# Patient Record
Sex: MALE | Race: WHITE
[De-identification: names, ages, dates, MRNs, and addresses within clinical notes are randomized per-mention and may not be internally consistent; named-entity substitution may affect disease eponyms.]

---

## 2019-03-25 ENCOUNTER — HOSPITAL ENCOUNTER (EMERGENCY)
Dept: HOSPITAL 88 - ER | Age: 59
Discharge: HOME | End: 2019-03-25
Payer: COMMERCIAL

## 2019-03-25 VITALS — SYSTOLIC BLOOD PRESSURE: 149 MMHG | DIASTOLIC BLOOD PRESSURE: 96 MMHG

## 2019-03-25 VITALS — WEIGHT: 195 LBS | BODY MASS INDEX: 27.3 KG/M2 | HEIGHT: 71 IN

## 2019-03-25 DIAGNOSIS — Z85.47: ICD-10-CM

## 2019-03-25 DIAGNOSIS — G89.29: ICD-10-CM

## 2019-03-25 DIAGNOSIS — M54.9: ICD-10-CM

## 2019-03-25 DIAGNOSIS — R07.89: Primary | ICD-10-CM

## 2019-03-25 LAB
ALBUMIN SERPL-MCNC: 3.9 G/DL (ref 3.5–5)
ALBUMIN/GLOB SERPL: 1.1 {RATIO} (ref 0.8–2)
ALP SERPL-CCNC: 102 IU/L (ref 40–150)
ALT SERPL-CCNC: 26 IU/L (ref 0–55)
ANION GAP SERPL CALC-SCNC: 15 MMOL/L (ref 8–16)
BASOPHILS # BLD AUTO: 0.1 10*3/UL (ref 0–0.1)
BASOPHILS NFR BLD AUTO: 0.7 % (ref 0–1)
BUN SERPL-MCNC: 15 MG/DL (ref 7–26)
BUN/CREAT SERPL: 16 (ref 6–25)
CALCIUM SERPL-MCNC: 10.3 MG/DL (ref 8.4–10.2)
CHLORIDE SERPL-SCNC: 105 MMOL/L (ref 98–107)
CK MB SERPL-MCNC: 3.4 NG/ML (ref 0–5)
CK MB SERPL-MCNC: 3.8 NG/ML (ref 0–5)
CK SERPL-CCNC: 159 IU/L (ref 30–200)
CK SERPL-CCNC: 181 IU/L (ref 30–200)
CO2 SERPL-SCNC: 24 MMOL/L (ref 22–29)
DEPRECATED APTT PLAS QN: 31.5 SECONDS (ref 23.8–35.5)
DEPRECATED INR PLAS: 1.02
DEPRECATED NEUTROPHILS # BLD AUTO: 6.1 10*3/UL (ref 2.1–6.9)
EGFRCR SERPLBLD CKD-EPI 2021: > 60 ML/MIN (ref 60–?)
EOSINOPHIL # BLD AUTO: 0.2 10*3/UL (ref 0–0.4)
EOSINOPHIL NFR BLD AUTO: 2.4 % (ref 0–6)
ERYTHROCYTE [DISTWIDTH] IN CORD BLOOD: 12.6 % (ref 11.7–14.4)
GLOBULIN PLAS-MCNC: 3.5 G/DL (ref 2.3–3.5)
GLUCOSE SERPLBLD-MCNC: 119 MG/DL (ref 74–118)
HCT VFR BLD AUTO: 44.9 % (ref 38.2–49.6)
HGB BLD-MCNC: 15.2 G/DL (ref 14–18)
LYMPHOCYTES # BLD: 1.6 10*3/UL (ref 1–3.2)
LYMPHOCYTES NFR BLD AUTO: 18.2 % (ref 18–39.1)
MCH RBC QN AUTO: 31.3 PG (ref 28–32)
MCHC RBC AUTO-ENTMCNC: 33.9 G/DL (ref 31–35)
MCV RBC AUTO: 92.4 FL (ref 81–99)
MONOCYTES # BLD AUTO: 0.6 10*3/UL (ref 0.2–0.8)
MONOCYTES NFR BLD AUTO: 7.4 % (ref 4.4–11.3)
NEUTS SEG NFR BLD AUTO: 70.8 % (ref 38.7–80)
PLATELET # BLD AUTO: 292 X10E3/UL (ref 140–360)
POTASSIUM SERPL-SCNC: 4 MMOL/L (ref 3.5–5.1)
PROTHROMBIN TIME: 13.9 SECONDS (ref 11.9–14.5)
RBC # BLD AUTO: 4.86 X10E6/UL (ref 4.3–5.7)
SODIUM SERPL-SCNC: 140 MMOL/L (ref 136–145)

## 2019-03-25 PROCEDURE — 93005 ELECTROCARDIOGRAM TRACING: CPT

## 2019-03-25 PROCEDURE — 99284 EMERGENCY DEPT VISIT MOD MDM: CPT

## 2019-03-25 PROCEDURE — 36415 COLL VENOUS BLD VENIPUNCTURE: CPT

## 2019-03-25 PROCEDURE — 82550 ASSAY OF CK (CPK): CPT

## 2019-03-25 PROCEDURE — 80053 COMPREHEN METABOLIC PANEL: CPT

## 2019-03-25 PROCEDURE — 85730 THROMBOPLASTIN TIME PARTIAL: CPT

## 2019-03-25 PROCEDURE — 84484 ASSAY OF TROPONIN QUANT: CPT

## 2019-03-25 PROCEDURE — 85610 PROTHROMBIN TIME: CPT

## 2019-03-25 PROCEDURE — 85025 COMPLETE CBC W/AUTO DIFF WBC: CPT

## 2019-03-25 PROCEDURE — 82553 CREATINE MB FRACTION: CPT

## 2019-03-25 PROCEDURE — 71045 X-RAY EXAM CHEST 1 VIEW: CPT

## 2019-03-25 RX ADMIN — Medication ONE MG: at 15:16

## 2019-03-25 RX ADMIN — Medication ONE MG: at 13:59

## 2019-03-25 NOTE — DIAGNOSTIC IMAGING REPORT
Examination: Single AP view of the chest.



COMPARISON: None.



INDICATION: Chest pain

     

DISCUSSION:



Lines/tubes:  None.



Lungs:  The lungs are well inflated and clear. There is no evidence of

pneumonia or pulmonary edema.



Pleura:  There is no pleural effusion or pneumothorax.



Heart and mediastinum:  The heart and the mediastinum are unremarkable.



Bones and soft tissues:  No acute bony abnormalities.  Right upper quadrant

surgical clips likely reflective of cholecystectomy.



IMPRESSION:

 

1.   No acute cardiopulmonary abnormalities.



Signed by: Dr. Geovanny Casanova M.D. on 3/25/2019 12:30 PM

## 2019-03-25 NOTE — XMS REPORT
Summary of Care

                             Created on: 2018



ELISABETH STALLINGS

External Reference #: [id number]

: 1960

Sex: Male



Demographics







                          Address                   [address] [place] [address]  [address]

 

                          Preferred Language        Unknown

 

                          Marital Status            Unknown

 

                          L[employer] Affiliation     Unknown

 

                          Race                      Other Race

 

                          Ethnic Group              Non-





Author







                          Author                    Aarti Shane

 

                          [employer] [facility]              Unknown

 

                          Address                   Unknown

 

                          Phone                     Unavailable







Care Team Providers







                    Care Team Member Name    Role                Phone

 

                    MELVIN DEL VALLE M.D.    Unavailable         Unavailable

 

                    MARLENI VILLAR MD    Unavailable         Unavailable

 

                          Unavailable               Unavailable







Functional Status







                    Name                Dates               Details

 

                                        Functional status health issues are not documented

                                                    Status: 









                    Name                Dates               Details

 

                                        Cognitive status health issues are not documented

                                                    Status: 







Problems







                    Name                Dates               Details

 

                                        Depressive disorder (311, F32.9) 

                                                    Status: Active

 

                                        Primary insomnia (307.42, F51.01) 

                                                    Status: Active

 

                                        Insomnia (780.52, G47.00) 

                                                    Status: Active

 

                                        Essential tremor (333.1, G25.0) 

                                                    Status: Active

 

                                        Cognitive complaints (799.59, R41.9) 

                                                    Status: Active

 

                                        Tension headache (307.81, G44.209) 

                                                    Status: Active







Medications







                    Name                Dates               Details

 

                                        Propranolol HCl ER 60 MG Oral Capsule Extended Release 24 Hour

TAKE 1 CAPSULE DAILY



 

                                         Quantity: 30









MELVIN DEL VALLE M.D. * 





                                         Start : 14-Sep-2017





Active

Amitriptyline HCl - 10 MG Oral Tablet

TAKE 0.5 TABLET BEDTIME

* 





                           Quantity: 15              Refills: 6









MELVIN DEL VALLE M.D. * 





                                         Start : 2018





Active





Allergies and Adverse Reactions







                    Name                Dates               Details

 

                    Iodine SOLN (Allergy)                        Status: Active









Procedures







                    Procedure           Dates               Details

 

                    Procedures not documented                         







Immunization







                    Name                Dates               Details

 

                                        Immunizations not documented

                                                     







Social History







                    Name                Dates               Details

 

                                        -

                                                    Status: 









                    Name                Dates               Details

 

                    Former smoker                            







Vital Signs







                Date            Test            Result          Details

 

                                                                 

 

                    No Known Vitals to report                         







Results







                Date            Description     Value           Details

 

                    Results not documented                         

 

                                                                 







Plan of Care







                    Name                Dates               Details

 

                                        Planned Observations 

 

                    Planned Goals not documented                         

 

                                        Planned Encounters 

 

                                        Appointment; JOS TORRES, PHD

                                        On: 27-Mar-2018 16:00



 

                                        Appointment; MELVIN DEL VALLE M.D.

                                        On: 2018 10:30









Instructions







                    Name                Dates               Details

 

                                        Instructions not documented

                                                     







Encounters







                                        Appointment; MELVIN DEL VALLE M.D. 

Encounter Diagnosis: Problem not documented

                                        On: 2017 9:30



 

                                        Appointment; MELVIN DEL VALLE M.D. 

Encounter Diagnosis: Problem not documented

                                        On: 14-Sep-2017 9:30



 

                                        Appointment; MELVIN DEL VALLE M.D. 

Encounter Diagnosis: Problem not documented

                                        On: 2018 10:30



 

                                        Appointment; JOS TORRES, PHD 

Encounter Diagnosis: Problem not documented

                                        On: 2018 8:30



 

                                        Appointment; JOS TORRES, PHD 

Encounter Diagnosis: Problem not documented

                                        On: 5-Mar-2018 15:00

## 2019-03-25 NOTE — XMS REPORT
Clinical Summary

                             Created on: 2019



PatsyGeovanny crooks

External Reference #: [id number]

: 1960

Sex: Male



Demographics







                          Address                   [address]

[place]  [address]

 

                          Home Phone                [phone]

 

                          Preferred Language        English

 

                          Marital Status            Unknown

 

                          Faith Affiliation     None

 

                          Race                      White

 

                          Ethnic Group              Non-





Author







                          Author                    [facility]

 

                          Address                   Unknown

 

                          Phone                     Unavailable







Support







                Name            Relationship    Address         Phone

 

                    Evette Stallings       [employer]                [address]

[place]  [address]                      [phone]

 

                CURRY STALLINGS    [employer]            Unknown         [phone]







Care Team Providers







                    Care Team Member Name    Role                Phone

 

                    Nestor Chu    PCP                 [phone]







Allergies







                                        Comments



                 Active Allergy     Reactions       Severity        Noted Date 

 

                                         



                 Iodine And Iodide     Anaphylaxis     High            2015 



                                         Containing Products    







Medications







                          End Date                  Status



              Medication     Sig          Dispensed     Refills      Start  



                                         Date  

 

                                                    Active



                 propranolol (INDERAL) 60     Take 0.5        0                 



                     MG tablet           tablets (30         7  



                                         mg total) by     



                                         mouth once at     



                                         bedtime.     

 

                                                    Active



                     cyanocobalamin (VITAMIN     Take 100 mcg        0   



                           B-12) 100 MCG tablet      by mouth     



                                         daily.     

 

                          2019                Discontinued



                     difluprednate (DUREZOL)     Apply to            0   



                           0.05 % Drop               eye(s) 4     



                                         (four) times     



                                         daily.     







Active Problems







 



                           Problem                   Noted Date

 

 



                           Essential tremor          03/10/2017

 

 



                           Bradycardia               03/10/2017

 

 



                           Chest pressure            2017







Encounters







                          Care Team                 Description



                     Date                Type                Specialty  

 

                                        



Pat Mcbride MD             



                           2019                Anesthesia   



                                         Event   

 

                                        



Dean Palma MD                 EXTRACTION,CATARACT W/IOL



                           2019                Surgery   

 

                                        



Dean Palma MD                  



                           2019                Hospital   



                                         Encounter   

 

                                                     



                     2019          Hospital            Pre-Admission Testing  



                                         Encounter   



after 2018



Family History







   



                 Medical History     Relation        Name            Comments

 

   



                           COPD                      Father  

 

   



                           Cancer                    Father  

 

   



                           Hypertension              Father  

 

   



                           Arthritis                 Mother  

 

   



                           Diabetes                  Paternal  



                                         Uncle  

 

   



                           Cancer                    Sister  









   



                 Relation        Name            Status          Comments

 

   



                                         Father   

 

   



                                         Mother   

 

   



                                         Paternal Uncle   

 

   



                                         Sister   







Social History







                                        Date



                 Tobacco Use     Types           Packs/Day       Years Used 

 

                                        Quit: 1998



                                         Former Smoker    

 

    



                           Smokeless Tobacco: Former       Quit: 2009



                                         User   









   



                 Alcohol Use     Drinks/Week     oz/Week         Comments

 

   



                 Yes             1 Glasses of     1.8             social drinking



                                         wine  



                                         2 Cans of  



                                         beer  









 



                           Sex Assigned at Birth     Date Recorded

 

 



                                         Not on file 









                                        Industry



                           Job Start Date            Occupation 

 

                                        Not on file



                           Not on file               Not on file 









                                        Travel End



                           Travel History            Travel Start 

 





                                         No recent travel history available.







Last Filed Vital Signs







                                        Time Taken



                           Vital Sign                Reading 

 

                                        2019 12:15 PM CST



                           Blood Pressure            115/74 

 

                                        2019 12:15 PM CST



                           Pulse                     57 

 

                                        2019 11:56 AM CST



                           Temperature               36.6 C (97.9 F) 

 

                                        2019 12:15 PM CST



                           Respiratory Rate          18 

 

                                        2019 12:15 PM CST



                           Oxygen Saturation         96% 

 

                                        -



                           Inhaled Oxygen            - 



                                         Concentration  

 

                                        2019  1:28 PM CST



                           Weight                    88.5 kg (195 lb) 

 

                                        2019  1:28 PM CST



                           Height                    177.8 cm (5' 10") 

 

                                        2019  1:28 PM CST



                           Body Mass Index           27.98 







Plan of Treatment





Not on file



Implants







                    Device Identifier    Shelf Expiration Date    Model / Serial / Lot



                 Implanted       Type            Area            Manufactur   



                                         er   

 

                                        10/12/2019          ZCB00 24.0 /

                                        [id number] /





                 Iol,Tecnis 1-Piece Zcb00 24.0 -     Ophthalmol      Left: Eye       ADVANCED   



                     [id number]         ogy                 MEDICAL   



                           Implanted: Qty: 1 on 2015 by       [employer]   



                           Dean Palma MD       FORMER   



                                         ALLERGAN   

 

                                        2021          [id number] /

                                        [id number] /





                 Iol Tecnis Zcb00 23.0 Nikhil     Ophthalmol      Right: Eye      [employer]V [employer]   



                     [id number] - [id number]     ogy                 [employer]   



                                         Implanted: Qty: 1 on 2019 by      



                                         Dean Palma MD      







Procedures







                                        Comments



                 Procedure Name     Priority        Date/Time       Associated Diagnosis 

 

                                         



                     EXTRACTION,CATARACT W/IOL      2019          Cataract of right eye, 



                           10:30 AM CST              unspecified cataract type 

 

  



                                         Case Notes



                                         CASE



                                         RESCHEDULE



                                         D VIA FAX



                                         ON 19



                                         FROM



                                         19 TO



                                         19



after 2018



Results

Not on fileafter 2018



Insurance







     



            Payer      Benefit     Subscriber ID     Type       Phone      Address



                                         Plan /    



                                         Group    

 

     



                 [facility]     [employer]      xxxxxxxxx       HMO/POS  



                           CARE                      POS SELECT    



                                         CHOICE    









     



            Guarantor Name     Account     Relation to     Date of     Phone      Billing Address



                     Type                Patient             Birth  

 

     



            TheGeovanny crooks     Personal/F     Self       1960     [phone] [address]               (Home)              [place] [address]







Advance Directives





For more information, please contact:



[id number] [place] [address] [phone]









                          Date Inactivated          Comments



                           Code Status               Date Activated  

 

                          3/9/2017  6:27 PM          



                           Full Code                 3/8/2017 10:57 PM  









  



                           This code status was determined by:     Patient

## 2019-03-25 NOTE — XMS REPORT
Patient Summary Document

                             Created on: 2019



ELISABETH STALLINGS

External Reference #: [id number]

: 1960

Sex: Male



Demographics







                          Address                   [address] [place]  [address]

 

                          Home Phone                [phone]

 

                          Preferred Language        Unknown

 

                          Marital Status            Unknown

 

                          Latter-day Affiliation     Unknown

 

                          Race                      Unknown

 

                          Ethnic Group              Unknown





Author







                          Author                    [facility]

 

                          Address                   Unknown

 

                          Phone                     Unavailable







Care Team Providers







                    Care Team Member Name    Role                Phone

 

                    SUSAN HERRERA    Unavailable         Unavailable







Problems

This patient has no known problems.



Allergies, Adverse Reactions, Alerts

This patient has no known allergies or adverse reactions.



Medications

This patient has no known medications.



Results







           Test Description    Test Time    Test Comments    Text Results    Atomic Results    Result

 Comments

 

                HEMOGLOBIN A1C    2017 08:43:00                      

 

   

 

                HEMOGLOBIN A1C (BEAKER) (test code=368)    5.7 %           4.3-6.1          





CREATINE KINASE (CK), TOTAL AND [id number] 23:38:00* 





                Test Item       Value           Reference Range    Comments

 

                CREATINE KINASE TOTAL (BEAKER) (test code=380)    63 U/L                     

 

                CREATINE KINASE-MB (BEAKER) (test code=750)    0.9 ng/mL       0.0-6.6          

 

                CREATINE KINASE-MB INDEX (BEAKER) (test code=395)    1.4 %                            





Effective 2014: CK-MB Reference Range ChangeNew: 0.0-6.6    Previous: 0.0-
4.9CK-MB Reference Range:<6.7      Normal6.7-10.0  Borderline>10.0     Abnormal
TROPONIN -03-01 23:38:00* 





                Test Item       Value           Reference Range    Comments

 

                TROPONIN I (BEAKER) (test code=397)    0.01 ng/mL      0.00-0.03        





Effective 2014: Reference Range ChangeNew: 0.00-0.03   Previous 0.00-0.15T
roponin I (TnI) levels must be interpreted in the context of the presenting symp
toms and the clinical findings. Elevated TnI levels indicate myocardial damage, 
but are not specific for ischemic heart disease. Elevated TnI levels are seen in
patients with other cardiac conditions (including myocarditis and congestive he
art failure), and slight TnI elevations occur in patients with other conditions,
including sepsis, renal failure, acidosis, acute neurological disease, and pers
istent tachyarrhythmia.LIPID [id number] 23:31:00* 





                Test Item       Value           Reference Range    Comments

 

                TRIGLYCERIDES (BEAKER) (test code=540)    133 mg/dL                       Specimen slightly hemolyzed



 

                CHOLESTEROL (BEAKER) (test code=631)    180 mg/dL                       Specimen slightly hemolyzed

 

                HDL CHOLESTEROL (BEAKER) (test code=976)    30 mg/dL                         

 

                LDL CHOLESTEROL CALCULATED (BEAKER) (test code=633)    123 mg/dL                        





Triglyceride Reference Range:   Low Risk         <150   Borderline    150-199   
High Risk     200-499   Very High Risk  >=500Cholesterol Reference Range:   Low 
Risk         <200   Borderline    200-239    High Risk        >240HDL 
Cholesterol Reference Range:   Low Risk         >=60   High Risk         <40LDL 
Cholesterol Reference Range:   Optimal          <100   Near Optimal  100-129   
Borderline    130-159   High          160-189   Very High       >=190   B-TYPE 
NATRIURETIC FACTOR (BNP)2017 21:16:00* 





                Test Item       Value           Reference Range    Comments

 

                B-TYPE NATRIURETIC PEPTIDE (BEAKER) (test code=700)    < pg/mL         0-100            





TROPONIN -74-31 19:22:00* 





                Test Item       Value           Reference Range    Comments

 

                TROPONIN I (BEAKER) (test code=397)    < ng/mL         0.00-0.03        





Effective 2014: Reference Range ChangeNew: 0.00-0.03   Previous 0.00-0.15T
roponin I (TnI) levels must be interpreted in the context of the presenting symp
toms and the clinical findings. Elevated TnI levels indicate myocardial damage, 
but are not specific for ischemic heart disease. Elevated TnI levels are seen in
patients with other cardiac conditions (including myocarditis and congestive he
art failure), and slight TnI elevations occur in patients with other conditions,
including sepsis, renal failure, acidosis, acute neurological disease, and pers
istent tachyarrhythmia.CREATINE KINASE (CK), TOTAL AND [id number] 18:00:00* 





                Test Item       Value           Reference Range    Comments

 

                CREATINE KINASE TOTAL (BEAKER) (test code=380)    74 U/L                     

 

                CREATINE KINASE-MB (BEAKER) (test code=750)    1.0 ng/mL       0.0-6.6          

 

                CREATINE KINASE-MB INDEX (BEAKER) (test code=395)    1.4 %                            





Effective 2014: CK-MB Reference Range ChangeNew: 0.0-6.6    Previous: 0.0-
4.9CK-MB Reference Range:<6.7      Normal6.7-10.0  Borderline>10.0     Abnormal
PT/[id number] 17:54:00* 





                Test Item       Value           Reference Range    Comments

 

                PROTIME (BEAKER) (test code=759)    13.7 seconds    11.7-14.7        

 

                INR (BEAKER) (test code=370)    1.1             <=5.9            

 

                PARTIAL THROMBOPLASTIN TIME (BEAKER) (test code=760)    29.2 seconds    22.5-36.0        







RECOMMENDED COUMADIN/WARFARIN INR THERAPY RANGESSTANDARD DOSE: 2.0 - 3.0   Inclu
colt: PROPHYLAXIS for venous thrombosis, systemic embolization; TREATMENT for panfilo
ous thrombosis and/or pulmonary embolus.HIGH RISK: Target INR is 2.5-3.5 for pat
ients with mechanical heart valves.[id number] 17:54:00* 





                Test Item       Value           Reference Range    Comments

 

                MAGNESIUM (BEAKER) (test code=627)    2.2 mg/dL       1.6-2.6          





BASIC METABOLIC [id number] 17:54:00* 





                Test Item       Value           Reference Range    Comments

 

                SODIUM (BEAKER) (test code=381)    140 meq/L       136-145          

 

                POTASSIUM (BEAKER) (test code=379)    3.8 meq/L       3.5-5.1          

 

                CHLORIDE (BEAKER) (test code=382)    108 meq/L                  

 

                CO2 (BEAKER) (test code=355)    23 meq/L        22-29            

 

                BLOOD UREA NITROGEN (BEAKER) (test code=354)    17 mg/dL        7-21             

 

                CREATININE (BEAKER) (test code=358)    1.00 mg/dL      0.57-1.25        

 

                GLUCOSE RANDOM (BEAKER) (test code=652)    108 mg/dL                  

 

                CALCIUM (BEAKER) (test code=697)    9.4 mg/dL       8.4-10.2         

 

                EGFR (BEAKER) (test code=1092)    78 mL/min/1.73 sq m                    ESTIMATED GFR IS NOT AS 

ACCURATE AS CREATININE CLEARANCE IN PREDICTING GLOMERULAR FILTRATION RATE. 
ESTIMATED GFR IS NOT APPLICABLE FOR DIALYSIS PATIENTS.





CBC W/PLT COUNT & AUTO [id number] 17:43:00* 





                Test Item       Value           Reference Range    Comments

 

                WHITE BLOOD CELL COUNT (BEAKER) (test code=775)    7.3 K/ L        4.0-10.0         

 

                RED BLOOD CELL COUNT (BEAKER) (test code=761)    4.65 M/ L       4.20-5.80        

 

                HEMOGLOBIN (BEAKER) (test code=410)    15.0 GM/DL      13.0-16.8        

 

                HEMATOCRIT (BEAKER) (test code=411)    42.8 %          40.0-50.0        

 

                MEAN CORPUSCULAR VOLUME (BEAKER) (test code=753)    91.9 fL         82.0-98.0        

 

                MEAN CORPUSCULAR HEMOGLOBIN (BEAKER) (test code=751)    32.2 pg         27.0-33.0        

 

                    MEAN CORPUSCULAR HEMOGLOBIN CONC (BEAKER) (test code=752)    35.1 GM/DL          32.0-36.0

                                         

 

                RED CELL DISTRIBUTION WIDTH (BEAKER) (test code=412)    12.6 %          10.3-14.2        

 

                PLATELET COUNT (BEAKER) (test code=756)    234 K/CU MM     150-430          

 

                MEAN PLATELET VOLUME (BEAKER) (test code=754)    6.8 fL          6.5-10.5         

 

                NUCLEATED RED BLOOD CELLS (BEAKER) (test code=413)    0 /100 WBC      0-0              

 

                NEUTROPHILS RELATIVE PERCENT (BEAKER) (test code=429)    62 %                             

 

                LYMPHOCYTES RELATIVE PERCENT (BEAKER) (test code=430)    27 %                             

 

                MONOCYTES RELATIVE PERCENT (BEAKER) (test code=431)    8 %                              

 

                EOSINOPHILS RELATIVE PERCENT (BEAKER) (test code=432)    3 %                              

 

                BASOPHILS RELATIVE PERCENT (BEAKER) (test code=437)    1 %                              

 

                NEUTROPHILS ABSOLUTE COUNT (BEAKER) (test code=670)    4.50 K/ L       1.80-8.00        

 

                LYMPHOCYTES ABSOLUTE COUNT (BEAKER) (test code=414)    1.96 K/ L       1.48-4.50        

 

                MONOCYTES ABSOLUTE COUNT (BEAKER) (test code=415)    0.59 K/ L       0.00-1.30        

 

                EOSINOPHILS ABSOLUTE COUNT (BEAKER) (test code=416)    0.22 K/ L       0.00-0.50        

 

                BASOPHILS ABSOLUTE COUNT (BEAKER) (test code=417)    0.05 K/ L       0.00-0.20        





0.00